# Patient Record
(demographics unavailable — no encounter records)

---

## 2025-05-22 NOTE — HEALTH RISK ASSESSMENT
[No] : No [No falls in past year] : Patient reported no falls in the past year [Little interest or pleasure doing things] : 1) Little interest or pleasure doing things [Feeling down, depressed, or hopeless] : 2) Feeling down, depressed, or hopeless [0] : 2) Feeling down, depressed, or hopeless: Not at all (0) [PHQ-2 Negative - No further assessment needed] : PHQ-2 Negative - No further assessment needed [Never] : Never [Employed] : employed [# Of Children ___] : has [unfilled] children [Feels Safe at Home] : Feels safe at home [Fully functional (bathing, dressing, toileting, transferring, walking, feeding)] : Fully functional (bathing, dressing, toileting, transferring, walking, feeding) [Fully functional (using the telephone, shopping, preparing meals, housekeeping, doing laundry, using] : Fully functional and needs no help or supervision to perform IADLs (using the telephone, shopping, preparing meals, housekeeping, doing laundry, using transportation, managing medications and managing finances) [Smoke Detector] : smoke detector [Carbon Monoxide Detector] : carbon monoxide detector [Seat Belt] :  uses seat belt [Sunscreen] : uses sunscreen [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

## 2025-05-22 NOTE — PLAN
[FreeTextEntry1] : Patient presents for a physical.  Routine labs ordered (CBC, CMP, TSH, Lipids, U/a). repeat colonoscopy in 2026  #HLD #obesity He would like to start injection weight loss medications, however, his grandmother did have thyroid cancer.  He is unsure of which type.  If non-medullary cancer, will start GLP-1 medications.  He will verify with us once he finds out.  Advised diet and exercise for now

## 2025-05-22 NOTE — HISTORY OF PRESENT ILLNESS
[FreeTextEntry1] : physical [de-identified] : Patient presents for physical. had a colonoscopy last year and had some polyps.  Due for a repeat in 3 years.  Has spinal issues and seeing a spinal specialist.  Had a recent flare up of back pain and is feeling better now.    Would like to discuss weight loss meds.  Has family hx of thyroid cancer.

## 2025-07-08 NOTE — HISTORY OF PRESENT ILLNESS
[FreeTextEntry1] : f/u on zepbound and back pain [de-identified] : Patient presents for f/u on zepbound and back pain. Having on going back pain from disc issues.  Limits his exercise.  Using Aleve and Ibuprofen.  Did PT Lost about 15 pounds since visit.   Would like to discuss the hemorrhoid he had.

## 2025-07-08 NOTE — PLAN
[FreeTextEntry1] : Patient presents for f/u  #Obesity #HLD Good response to Zepbound 2.5mg qweekly. C/w 2.5mg qweekly.  Will f/u in 4 weeks to assess dosage and side effects. C/w diet and exercise  #back pain May use Tylenol 1000mg TID PRN, Aleve (one tablet twice a day), and lidocaine patches Advised to f/u with spinal specialist to consider injections  #hemorrhoid Denies constipation, pain or bleeding Will monitor for now Advised to increase Fiber intake.

## 2025-07-08 NOTE — HISTORY OF PRESENT ILLNESS
[FreeTextEntry1] : f/u on zepbound and back pain [de-identified] : Patient presents for f/u on zepbound and back pain. Having on going back pain from disc issues.  Limits his exercise.  Using Aleve and Ibuprofen.  Did PT Lost about 15 pounds since visit.   Would like to discuss the hemorrhoid he had.